# Patient Record
Sex: FEMALE | URBAN - METROPOLITAN AREA
[De-identification: names, ages, dates, MRNs, and addresses within clinical notes are randomized per-mention and may not be internally consistent; named-entity substitution may affect disease eponyms.]

---

## 2023-07-19 ENCOUNTER — OFFICE VISIT (OUTPATIENT)
Facility: LOCATION | Age: 48
End: 2023-07-19
Payer: COMMERCIAL

## 2023-07-19 DIAGNOSIS — H44.522 PHTHISIS BULBI OF LEFT EYE: Primary | ICD-10-CM

## 2023-07-19 DIAGNOSIS — H40.021 OPEN ANGLE WITH BORDERLINE FINDINGS, HIGH RISK, RIGHT EYE: ICD-10-CM

## 2023-07-19 PROCEDURE — 92020 GONIOSCOPY: CPT | Performed by: STUDENT IN AN ORGANIZED HEALTH CARE EDUCATION/TRAINING PROGRAM

## 2023-07-19 PROCEDURE — 99204 OFFICE O/P NEW MOD 45 MIN: CPT | Performed by: STUDENT IN AN ORGANIZED HEALTH CARE EDUCATION/TRAINING PROGRAM

## 2023-07-19 ASSESSMENT — INTRAOCULAR PRESSURE
OD: 14
OS: 24
OD: 12

## 2023-07-19 NOTE — IMPRESSION/PLAN
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 POhx: Monocular, left eye trauma (42 years ago) FOhx: unknown PMhx: none. Eye medications: none. Plan: Testing:
OCT/ONH 07/2023: OD baseline. PACHY 07/2023: OD baseline. Gonio 07/2023: OD: T narrow, Inf SS, S/N PTM Today:
IOP acceptable OU. Recommend baseline testing to determine whether or not they have glaucoma. Educated patient on the diagnosis and physiological development of glaucoma in great detail. Explained to patient of glaucoma being high eye pressure, having peripheral vision loss and enlarged/damaged optic nerves. Informed patient high eye pressure can cause damage to the optic nerves therefore leading to irreversible vision loss. Educated the patient that glaucoma is a chronic disease and there is no cure but it can be treated and controlled. Discussed different treatment options being medications, laser vs surgery. Patient expressed understanding. Plan: No treatment indicated at this time. Patient left before testing was performed, he stated he will reschedule and return to clinic another time to complete baseline testing.
